# Patient Record
Sex: FEMALE | Race: WHITE
[De-identification: names, ages, dates, MRNs, and addresses within clinical notes are randomized per-mention and may not be internally consistent; named-entity substitution may affect disease eponyms.]

---

## 2020-01-06 ENCOUNTER — HOSPITAL ENCOUNTER (OUTPATIENT)
Dept: HOSPITAL 95 - ORSCMMR | Age: 45
Discharge: HOME | End: 2020-01-06
Attending: OBSTETRICS & GYNECOLOGY
Payer: COMMERCIAL

## 2020-01-06 VITALS — BODY MASS INDEX: 32.56 KG/M2 | HEIGHT: 69.02 IN | WEIGHT: 219.8 LBS

## 2020-01-06 DIAGNOSIS — R10.2: ICD-10-CM

## 2020-01-06 DIAGNOSIS — Z79.899: ICD-10-CM

## 2020-01-06 DIAGNOSIS — G40.909: ICD-10-CM

## 2020-01-06 DIAGNOSIS — Z79.01: ICD-10-CM

## 2020-01-06 DIAGNOSIS — N92.0: Primary | ICD-10-CM

## 2020-01-06 DIAGNOSIS — Z86.718: ICD-10-CM

## 2020-01-06 DIAGNOSIS — Z30.9: ICD-10-CM

## 2020-01-06 PROCEDURE — 0U5B8ZZ DESTRUCTION OF ENDOMETRIUM, VIA NATURAL OR ARTIFICIAL OPENING ENDOSCOPIC: ICD-10-PCS | Performed by: OBSTETRICS & GYNECOLOGY

## 2020-01-06 PROCEDURE — 0UT74ZZ RESECTION OF BILATERAL FALLOPIAN TUBES, PERCUTANEOUS ENDOSCOPIC APPROACH: ICD-10-PCS | Performed by: OBSTETRICS & GYNECOLOGY

## 2020-01-06 NOTE — NUR
Ambulatory in Day Surgery
History, Chart, Medications and Allergies reviewed before start of
procedure.
Lungs clear T/O to Auscultation.
Patient confirms NPO status and agrees with scheduled surgery.
Pre-Op teaching done. Pt verbalizes understanding.
Patient States Post-Procedure ride home has been arranged.

## 2020-01-06 NOTE — NUR
Patient up to Ambulate independently. Gait steady.  Dressing to procedure site
clean, dry, intact with no visible drainage, swelling, erythema or bruising
noted.PT ABLE TO VOID WITHOUT DIFFICULTY. GIVEN MADONNA PANTIES,WIPES, AND
FRESH MARY PAD-MODERATE AMOUNT OF SANGINOUS DRAINAGE TO MARY PAD.  Discharge
instructions reviewed with patient.  Patient verbalizes understanding.  Copy
given to patient to take home.  Discharged via wheelchair to private car for
ride home.

## 2020-01-06 NOTE — NUR
INTO STEP VIA [facility]. PT A&OX3. REPORTS 3/10 ABDOMINAL/INCISIONAL PAIN. DENIES
NAUSEA AT THIS TIME. TOLERATING ICE CHIPS WELL. STERI STRIP X3 TO ABDOMEN-
C/D/I. MARY PAD WITH SCANT SANGINOUS DRAINAGE. NU INITIATED.